# Patient Record
Sex: MALE | Race: OTHER | NOT HISPANIC OR LATINO | URBAN - METROPOLITAN AREA
[De-identification: names, ages, dates, MRNs, and addresses within clinical notes are randomized per-mention and may not be internally consistent; named-entity substitution may affect disease eponyms.]

---

## 2023-08-22 ENCOUNTER — EMERGENCY (EMERGENCY)
Facility: HOSPITAL | Age: 28
LOS: 1 days | Discharge: HOME | End: 2023-08-22
Admitting: EMERGENCY MEDICINE
Payer: SELF-PAY

## 2023-08-22 VITALS
RESPIRATION RATE: 16 BRPM | OXYGEN SATURATION: 97 % | HEART RATE: 93 BPM | HEIGHT: 64 IN | DIASTOLIC BLOOD PRESSURE: 73 MMHG | TEMPERATURE: 97 F | SYSTOLIC BLOOD PRESSURE: 118 MMHG | WEIGHT: 134.92 LBS

## 2023-08-22 PROCEDURE — 99053 MED SERV 10PM-8AM 24 HR FAC: CPT

## 2023-08-22 PROCEDURE — 99284 EMERGENCY DEPT VISIT MOD MDM: CPT

## 2023-08-22 RX ORDER — ONDANSETRON 8 MG/1
4 TABLET, FILM COATED ORAL ONCE
Refills: 0 | Status: COMPLETED | OUTPATIENT
Start: 2023-08-22 | End: 2023-08-22

## 2023-08-22 RX ADMIN — ONDANSETRON 4 MILLIGRAM(S): 8 TABLET, FILM COATED ORAL at 01:03

## 2023-08-22 NOTE — ED PROVIDER NOTE - OBJECTIVE STATEMENT
29 yo M with no known PMHx, BIBA for N/V and public intoxication. Pt admits to drinking a few beers tonight, had 3 episodes of NBNB emesis subsequently and bystanders called 911 for him. Reports no medical complaints currently. Denies trauma, fall, HA, dizziness, bleeding, D/C, CP, SOB, palpitations, tremors, change in urinary/bowel function, and abdominal pain. No illicit drug use noted.

## 2023-08-22 NOTE — ED ADULT NURSE NOTE - OBJECTIVE STATEMENT
Pt Pt is alert and responsive.  used. ID [id number]. Pt was unable to participate in interview. Pt constantly falling asleep. No acute distress observed.

## 2023-08-22 NOTE — ED ADULT NURSE NOTE - NSFALLHARMRISKINTERV_ED_ALL_ED

## 2023-08-22 NOTE — ED ADULT TRIAGE NOTE - CHIEF COMPLAINT QUOTE
Pt brought in by EMS for complaint of alcohol intoxication and was noted to vomit 3 X by bystander as per EMS.  Pt denies any falls or injuries, states he only drank alcohol tonight. Denies any illicit drug use. P A & O X 3, ambulatory with steady gait.

## 2023-08-22 NOTE — ED PROVIDER NOTE - NSDCPRINTRESULTS_ED_ALL_ED
Your current hgbA1c is lower than your last level of 6.7. Keep up the good work! Continue to work on following a diabetic diet and exercise. Recheck this test: hgbA1c  in  3 months. Continue with current  medications. Patient requests all Lab, Cardiology, and Radiology Results on their Discharge Instructions

## 2023-08-22 NOTE — ED PROVIDER NOTE - PATIENT PORTAL LINK FT
You can access the FollowMyHealth Patient Portal offered by [facility] by registering at the following website: [email or web address]. By joining [employer]’s FollowMyHealth portal, you will also be able to view your health information using other applications (apps) compatible with our system.
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
## 2023-08-24 DIAGNOSIS — R11.2 NAUSEA WITH VOMITING, UNSPECIFIED: ICD-10-CM

## 2023-08-24 DIAGNOSIS — F10.129 ALCOHOL ABUSE WITH INTOXICATION, UNSPECIFIED: ICD-10-CM
